# Patient Record
Sex: MALE | Race: WHITE | URBAN - METROPOLITAN AREA
[De-identification: names, ages, dates, MRNs, and addresses within clinical notes are randomized per-mention and may not be internally consistent; named-entity substitution may affect disease eponyms.]

---

## 2017-11-28 ENCOUNTER — OFFICE VISIT (OUTPATIENT)
Dept: ORTHOPEDIC SURGERY | Age: 27
End: 2017-11-28

## 2017-11-28 VITALS
SYSTOLIC BLOOD PRESSURE: 117 MMHG | WEIGHT: 220 LBS | HEART RATE: 74 BPM | BODY MASS INDEX: 29.8 KG/M2 | HEIGHT: 72 IN | DIASTOLIC BLOOD PRESSURE: 81 MMHG

## 2017-11-28 DIAGNOSIS — M67.372 TRANSIENT SYNOVITIS, LEFT ANKLE AND FOOT: ICD-10-CM

## 2017-11-28 DIAGNOSIS — M79.672 FOOT PAIN, LEFT: Primary | ICD-10-CM

## 2017-11-28 PROBLEM — K64.5 THROMBOSED EXTERNAL HEMORRHOID: Status: ACTIVE | Noted: 2017-06-27

## 2017-11-28 PROCEDURE — 99203 OFFICE O/P NEW LOW 30 MIN: CPT | Performed by: PODIATRIST

## 2017-12-19 ENCOUNTER — OFFICE VISIT (OUTPATIENT)
Dept: ORTHOPEDIC SURGERY | Age: 27
End: 2017-12-19

## 2017-12-19 VITALS
BODY MASS INDEX: 29.8 KG/M2 | HEART RATE: 60 BPM | DIASTOLIC BLOOD PRESSURE: 80 MMHG | WEIGHT: 220.02 LBS | SYSTOLIC BLOOD PRESSURE: 119 MMHG | HEIGHT: 72 IN

## 2017-12-19 DIAGNOSIS — M67.372 TRANSIENT SYNOVITIS, LEFT ANKLE AND FOOT: Primary | ICD-10-CM

## 2017-12-19 PROCEDURE — 99212 OFFICE O/P EST SF 10 MIN: CPT | Performed by: PODIATRIST

## 2023-06-01 NOTE — PROGRESS NOTES
[facility]ail[facility]   2023    Rodney Romero (:  1990) is a 28 y.o. male, here for evaluation of the following medical concerns:    Chief Complaint   Patient presents with    Establish Care        ASSESSMENT/ PLAN  1. External hemorrhoids  -Stable no new bleeding. 2. Gastroesophageal reflux disease, unspecified whether esophagitis present  -Well-controlled. Not on any medications at this time    3. Seasonal allergies  -Continue Zyrtec    4. Mixed hyperlipidemia  -Obtain labs. Not on any medications at this time    5. Preventative health care  - TSH with Reflex to FT4; Future  - Lipid Panel; Future  - Comprehensive Metabolic Panel; Future  - CBC with Auto Differential; Future  - Hemoglobin A1C; Future    6. Encounter to establish care  Reviewed past history with patient. Return in about 6 months (around 2023) for annual physical .      HPI  -Patient is a 41-year-old male presenting to establish care with me. Past medical history significant for seasonal allergies, GERD, external hemorrhoids, hyperlipidemia. No new symptoms at this time. Takes Zyrtec and Flonase on and off for allergies, GERD symptoms well controlled not on any medications at this time. Denies any chest pain, shortness of breath. Noted his previous physician passed away and wants to establish care with me. ROS    CONSTITUTIONAL:  No fevers, chills, sweats or weight changes  EYES:  No redness or visual symptoms. EARS, NOSE AND THROAT:  No difficulties with hearing. No symptoms of rhinitis or sore throat. CARDIOVASCULAR:  No chest pains, palpitations, orthopnea or paroxysmal noctunal dyspnea. RESPIRATORY:  No dyspnea o exertion, wheezing or cough. GI:  No nausea, vomiting, diarrhea, constipation, abdominal pain, hematochezia or melena. :  No dysuria, urinary hesitancy or dribbling. No nocturia or urinary frequency. No abnormal urethral  discharge.   MUSCULOSKELETAL:  No muscle, joint or back aches  NEUROLOGIC:  No

## 2023-06-02 ENCOUNTER — OFFICE VISIT (OUTPATIENT)
Dept: INTERNAL MEDICINE CLINIC | Age: 33
End: 2023-06-02

## 2023-06-02 VITALS
WEIGHT: 218 LBS | HEART RATE: 68 BPM | HEIGHT: 73 IN | TEMPERATURE: 97.7 F | BODY MASS INDEX: 28.89 KG/M2 | OXYGEN SATURATION: 97 % | SYSTOLIC BLOOD PRESSURE: 122 MMHG | DIASTOLIC BLOOD PRESSURE: 76 MMHG

## 2023-06-02 DIAGNOSIS — K21.9 GASTROESOPHAGEAL REFLUX DISEASE, UNSPECIFIED WHETHER ESOPHAGITIS PRESENT: ICD-10-CM

## 2023-06-02 DIAGNOSIS — Z76.89 ENCOUNTER TO ESTABLISH CARE: Primary | ICD-10-CM

## 2023-06-02 DIAGNOSIS — J30.2 SEASONAL ALLERGIES: ICD-10-CM

## 2023-06-02 DIAGNOSIS — Z00.00 PREVENTATIVE HEALTH CARE: ICD-10-CM

## 2023-06-02 DIAGNOSIS — E78.2 MIXED HYPERLIPIDEMIA: ICD-10-CM

## 2023-06-02 DIAGNOSIS — K64.4 EXTERNAL HEMORRHOIDS: ICD-10-CM

## 2023-06-02 SDOH — ECONOMIC STABILITY: HOUSING INSECURITY
IN THE LAST 12 MONTHS, WAS THERE A TIME WHEN YOU DID NOT HAVE A STEADY PLACE TO SLEEP OR SLEPT IN A SHELTER (INCLUDING NOW)?: NO

## 2023-06-02 SDOH — ECONOMIC STABILITY: INCOME INSECURITY: HOW HARD IS IT FOR YOU TO PAY FOR THE VERY BASICS LIKE FOOD, HOUSING, MEDICAL CARE, AND HEATING?: NOT HARD AT ALL

## 2023-06-02 SDOH — ECONOMIC STABILITY: FOOD INSECURITY: WITHIN THE PAST 12 MONTHS, YOU WORRIED THAT YOUR FOOD WOULD RUN OUT BEFORE YOU GOT MONEY TO BUY MORE.: NEVER TRUE

## 2023-06-02 SDOH — ECONOMIC STABILITY: FOOD INSECURITY: WITHIN THE PAST 12 MONTHS, THE FOOD YOU BOUGHT JUST DIDN'T LAST AND YOU DIDN'T HAVE MONEY TO GET MORE.: NEVER TRUE

## 2023-06-02 ASSESSMENT — PATIENT HEALTH QUESTIONNAIRE - PHQ9
SUM OF ALL RESPONSES TO PHQ9 QUESTIONS 1 & 2: 0
SUM OF ALL RESPONSES TO PHQ QUESTIONS 1-9: 0
2. FEELING DOWN, DEPRESSED OR HOPELESS: 0
SUM OF ALL RESPONSES TO PHQ QUESTIONS 1-9: 0
1. LITTLE INTEREST OR PLEASURE IN DOING THINGS: 0

## 2023-08-08 ENCOUNTER — HOSPITAL ENCOUNTER (OUTPATIENT)
Age: 33
Discharge: HOME | End: 2023-08-08
Payer: COMMERCIAL

## 2023-08-08 DIAGNOSIS — Z00.00 PREVENTATIVE HEALTH CARE: ICD-10-CM

## 2023-08-08 LAB
ALBUMIN SERPL-MCNC: 4.8 G/DL (ref 3.4–5)
ALBUMIN/GLOB SERPL: 1.7 {RATIO} (ref 1.1–2.2)
ALP SERPL-CCNC: 97 U/L (ref 40–129)
ALT SERPL-CCNC: 42 U/L (ref 10–40)
ANION GAP SERPL CALCULATED.3IONS-SCNC: 9 MMOL/L (ref 3–16)
AST SERPL-CCNC: 36 U/L (ref 15–37)
BASOPHILS # BLD: 0 K/UL (ref 0–0.2)
BASOPHILS NFR BLD: 0.6 %
BILIRUB SERPL-MCNC: 0.5 MG/DL (ref 0–1)
BUN SERPL-MCNC: 16 MG/DL (ref 7–20)
CALCIUM SERPL-MCNC: 10.1 MG/DL (ref 8.3–10.6)
CHLORIDE SERPL-SCNC: 102 MMOL/L (ref 99–110)
CHOLEST SERPL-MCNC: 312 MG/DL (ref 0–199)
CO2 SERPL-SCNC: 26 MMOL/L (ref 21–32)
CREAT SERPL-MCNC: 1 MG/DL (ref 0.9–1.3)
DEPRECATED RDW RBC AUTO: 13.7 % (ref 12.4–15.4)
EOSINOPHIL # BLD: 0.1 K/UL (ref 0–0.6)
EOSINOPHIL NFR BLD: 2.4 %
EST. AVERAGE GLUCOSE BLD GHB EST-MCNC: 105.4 MG/DL
GFR SERPLBLD CREATININE-BSD FMLA CKD-EPI: >60 ML/MIN/{1.73_M2}
GLUCOSE SERPL-MCNC: 97 MG/DL (ref 70–99)
HBA1C MFR BLD: 5.3 %
HCT VFR BLD AUTO: 42.3 % (ref 40.5–52.5)
HDLC SERPL-MCNC: 41 MG/DL (ref 40–60)
HGB BLD-MCNC: 14.4 G/DL (ref 13.5–17.5)
LDLC SERPL CALC-MCNC: ABNORMAL MG/DL
LDLC SERPL-MCNC: 157 MG/DL
LYMPHOCYTES # BLD: 1.9 K/UL (ref 1–5.1)
LYMPHOCYTES NFR BLD: 38.4 %
MCH RBC QN AUTO: 28.9 PG (ref 26–34)
MCHC RBC AUTO-ENTMCNC: 34.1 G/DL (ref 31–36)
MCV RBC AUTO: 84.6 FL (ref 80–100)
MONOCYTES # BLD: 0.4 K/UL (ref 0–1.3)
MONOCYTES NFR BLD: 7.9 %
NEUTROPHILS # BLD: 2.5 K/UL (ref 1.7–7.7)
NEUTROPHILS NFR BLD: 50.7 %
PLATELET # BLD AUTO: 217 K/UL (ref 135–450)
PMV BLD AUTO: 8 FL (ref 5–10.5)
POTASSIUM SERPL-SCNC: 4.5 MMOL/L (ref 3.5–5.1)
PROT SERPL-MCNC: 7.6 G/DL (ref 6.4–8.2)
RBC # BLD AUTO: 5 M/UL (ref 4.2–5.9)
SODIUM SERPL-SCNC: 137 MMOL/L (ref 136–145)
T4 FREE SERPL-MCNC: 1.1 NG/DL (ref 0.9–1.8)
TRIGL SERPL-MCNC: 498 MG/DL (ref 0–150)
TSH SERPL DL<=0.005 MIU/L-ACNC: 4.29 UIU/ML (ref 0.27–4.2)
VLDLC SERPL CALC-MCNC: ABNORMAL MG/DL
WBC # BLD AUTO: 5 K/UL (ref 4–11)

## 2023-08-08 PROCEDURE — 85025 COMPLETE CBC W/AUTO DIFF WBC: CPT

## 2023-08-08 PROCEDURE — 83036 HEMOGLOBIN GLYCOSYLATED A1C: CPT

## 2023-08-08 PROCEDURE — 36415 COLL VENOUS BLD VENIPUNCTURE: CPT

## 2023-08-08 PROCEDURE — 80061 LIPID PANEL: CPT

## 2023-08-08 PROCEDURE — 84443 ASSAY THYROID STIM HORMONE: CPT

## 2023-08-08 PROCEDURE — 80053 COMPREHEN METABOLIC PANEL: CPT

## 2023-08-08 PROCEDURE — 84439 ASSAY OF FREE THYROXINE: CPT

## 2023-08-08 PROCEDURE — 83721 ASSAY OF BLOOD LIPOPROTEIN: CPT

## 2023-08-11 ENCOUNTER — OFFICE VISIT (OUTPATIENT)
Dept: INTERNAL MEDICINE CLINIC | Age: 33
End: 2023-08-11
Payer: COMMERCIAL

## 2023-08-11 VITALS
OXYGEN SATURATION: 98 % | DIASTOLIC BLOOD PRESSURE: 84 MMHG | SYSTOLIC BLOOD PRESSURE: 126 MMHG | WEIGHT: 216 LBS | BODY MASS INDEX: 28.63 KG/M2 | HEIGHT: 73 IN | TEMPERATURE: 97.4 F | HEART RATE: 68 BPM

## 2023-08-11 DIAGNOSIS — E03.8 SUBCLINICAL HYPOTHYROIDISM: ICD-10-CM

## 2023-08-11 DIAGNOSIS — K21.9 GASTROESOPHAGEAL REFLUX DISEASE, UNSPECIFIED WHETHER ESOPHAGITIS PRESENT: ICD-10-CM

## 2023-08-11 DIAGNOSIS — K64.4 EXTERNAL HEMORRHOIDS: ICD-10-CM

## 2023-08-11 DIAGNOSIS — J30.2 SEASONAL ALLERGIES: ICD-10-CM

## 2023-08-11 DIAGNOSIS — E78.2 MIXED HYPERLIPIDEMIA: ICD-10-CM

## 2023-08-11 DIAGNOSIS — Z00.00 ENCOUNTER FOR WELL ADULT EXAM WITHOUT ABNORMAL FINDINGS: Primary | ICD-10-CM

## 2023-08-11 PROCEDURE — 99395 PREV VISIT EST AGE 18-39: CPT | Performed by: STUDENT IN AN ORGANIZED HEALTH CARE EDUCATION/TRAINING PROGRAM

## 2023-10-13 ENCOUNTER — TELEPHONE (OUTPATIENT)
Dept: INTERNAL MEDICINE CLINIC | Age: 33
End: 2023-10-13

## 2023-10-25 NOTE — PROGRESS NOTES
Social Determinants of Health     Financial Resource Strain: Low Risk  (6/2/2023)    Overall Financial Resource Strain (CARDIA)     Difficulty of Paying Living Expenses: Not hard at all   Food Insecurity: No Food Insecurity (6/2/2023)    Hunger Vital Sign     Worried About Running Out of Food in the Last Year: Never true     Ran Out of Food in the Last Year: Never true   Transportation Needs: Unknown (6/2/2023)    PRAPARE - Transportation     Lack of Transportation (Medical): Not on file     Lack of Transportation (Non-Medical): No   Physical Activity: Not on file   Stress: Not on file   Social Connections: Not on file   Intimate Partner Violence: Not on file   Housing Stability: Unknown (6/2/2023)    Housing Stability Vital Sign     Unable to Pay for Housing in the Last Year: Not on file     Number of Places Lived in the Last Year: Not on file     Unstable Housing in the Last Year: No     DRUG/FOOD ALLERGIES: Patient has no known allergies. CURRENT MEDICATIONS  Prior to Admission medications    Medication Sig Start Date End Date Taking? Authorizing Provider   azelastine (ASTELIN) 0.1 % nasal spray 1 spray by Nasal route 2 times daily Use in each nostril as directed 10/26/23  Yes Sonny Croft, DO   Cetirizine HCl (ZYRTEC ALLERGY PO) Take by mouth   Yes ProviderLinda MD   hydrocortisone-pramoxine (ANALPRAM-HC) 2.5-1 % rectal cream APPLY RECTALLY 3 TIMES A DAY AS NEEDED 12/6/17   ProviderLinda MD     REVIEW OF SYSTEMS  The following systems were reviewed and revealed the following in addition to any already discussed in the HPI:  Review of Systems   Constitutional:  Negative for fatigue and fever. HENT:  Positive for congestion, rhinorrhea and tinnitus. Negative for ear pain, postnasal drip and sneezing. Eyes:  Negative for pain and visual disturbance. Respiratory:  Negative for cough and shortness of breath. Cardiovascular:  Negative for chest pain.    Gastrointestinal:  Negative for

## 2023-10-26 ENCOUNTER — PROCEDURE VISIT (OUTPATIENT)
Dept: AUDIOLOGY | Age: 33
End: 2023-10-26
Payer: COMMERCIAL

## 2023-10-26 ENCOUNTER — OFFICE VISIT (OUTPATIENT)
Dept: ENT CLINIC | Age: 33
End: 2023-10-26
Payer: COMMERCIAL

## 2023-10-26 VITALS
HEART RATE: 67 BPM | BODY MASS INDEX: 30.48 KG/M2 | SYSTOLIC BLOOD PRESSURE: 131 MMHG | WEIGHT: 230 LBS | DIASTOLIC BLOOD PRESSURE: 89 MMHG | RESPIRATION RATE: 16 BRPM | HEIGHT: 73 IN

## 2023-10-26 DIAGNOSIS — J30.2 SEASONAL ALLERGIES: Primary | ICD-10-CM

## 2023-10-26 DIAGNOSIS — Z01.10 ENCOUNTER FOR EXAM OF EARS AND HEARING W/O ABNORMAL FINDINGS: ICD-10-CM

## 2023-10-26 DIAGNOSIS — H93.8X3 EAR PRESSURE, BILATERAL: Primary | ICD-10-CM

## 2023-10-26 DIAGNOSIS — H93.13 TINNITUS OF BOTH EARS: ICD-10-CM

## 2023-10-26 DIAGNOSIS — Z01.10 NORMAL HEARING TEST: ICD-10-CM

## 2023-10-26 PROCEDURE — 92588 EVOKED AUDITORY TST COMPLETE: CPT | Performed by: AUDIOLOGIST

## 2023-10-26 PROCEDURE — 92567 TYMPANOMETRY: CPT | Performed by: AUDIOLOGIST

## 2023-10-26 PROCEDURE — 31231 NASAL ENDOSCOPY DX: CPT | Performed by: STUDENT IN AN ORGANIZED HEALTH CARE EDUCATION/TRAINING PROGRAM

## 2023-10-26 PROCEDURE — 92557 COMPREHENSIVE HEARING TEST: CPT | Performed by: AUDIOLOGIST

## 2023-10-26 PROCEDURE — 99204 OFFICE O/P NEW MOD 45 MIN: CPT | Performed by: STUDENT IN AN ORGANIZED HEALTH CARE EDUCATION/TRAINING PROGRAM

## 2023-10-26 RX ORDER — AZELASTINE 1 MG/ML
1 SPRAY, METERED NASAL 2 TIMES DAILY
Qty: 30 ML | Refills: 3 | Status: SHIPPED | OUTPATIENT
Start: 2023-10-26

## 2023-10-26 ASSESSMENT — ENCOUNTER SYMPTOMS
RHINORRHEA: 1
NAUSEA: 0
VOMITING: 0
COUGH: 0
EYE PAIN: 0
SHORTNESS OF BREATH: 0

## 2023-10-26 NOTE — PROGRESS NOTES
Simi Bob   1990, 28 y.o. male   [id number]       Referring Provider: Vianey Davis DO  Referral Type: In an order in [address] [place] [address]    Reason for Visit: Evaluation of the cause of disorders of hearing, tinnitus, or balance. ADULT AUDIOLOGIC EVALUATION      Simi Bob is a 28 y.o. male seen today, 10/26/2023 , for an initial audiologic evaluation. Patient was seen by Vianey Davis DO following today's evaluation. Patient was alone. AUDIOLOGIC AND OTHER PERTINENT MEDICAL HISTORY:      Simi Bob noted aural fullness, tinnitus, history of recreational noise exposure, and family history of hearing loss. Patient reports perceived decline in hearing and bilateral aural fullness after going to a bar in [place] around three weeks ago. He noted having constant bilateral tinnitus after but has since resolved. Patient has a family history of hearing loss, father, occurring in childhood. Patient has a history of occupational noise exposure, concerts, with the use of hearing protection. Simi Bob denied otalgia, dizziness, history of head trauma, and history of ear surgery. Date: 10/26/2023     IMPRESSIONS:      Normal middle ear pressure and compliance, bilaterally. Mixed cochlear outer hair cell function from 1500-12,000 Hz, bilaterally. Normal hearing sensitivity with excellent word understanding at normal conversation level, bilaterally. Follow medical recommendations of Aaron Croft DO.     ASSESSMENT AND FINDINGS:     Otoscopy revealed: Clear ear canals bilaterally    RIGHT EAR:  Hearing Sensitivity:Hearing sensitivity within normal limits from 250-8000 Hz  Speech Recognition Threshold: 5 dB HL  Word Recognition:Excellent (100%), based on NU-6 Ordered-by-Difficulty 10-word list at 50 dBHL using recorded speech stimuli. Tympanometry: Normal peak pressure and compliance, Type A tympanogram, consistent with normal middle ear function.       LEFT EAR:  Hearing Sensitivity:Hearing sensitivity within normal

## 2023-12-07 ENCOUNTER — TELEPHONE (OUTPATIENT)
Dept: INTERNAL MEDICINE CLINIC | Age: 33
End: 2023-12-07

## 2023-12-07 NOTE — TELEPHONE ENCOUNTER
----- Message from Erwin Colvin sent at 12/7/2023  4:04 PM EST -----  Subject: Message to Provider    QUESTIONS  Information for Provider? Delisa Mejía with [facility] had   called on behalf of the patient is regards to life insurance that he had   purchased but they had faxed over a request for medical records to be sent   over which they did receive. But they are missing lab work records from   his August visit but they did receive everything else. Fax- [phone]   if these could be faxed over to them. Phone# [phone] Direct extension   310 for Bella. Please advise.   ---------------------------------------------------------------------------  --------------  Jennifer Sneed INFO  [phone]; OK to leave message on voicemail  ---------------------------------------------------------------------------  --------------  SCRIPT ANSWERS  Relationship to Patient? Covered Entity  Covered Entity Type? Other  Other Covered Entity Type? Medical record Company  Representative Name?  Delisa Mejía

## 2024-08-26 ASSESSMENT — PATIENT HEALTH QUESTIONNAIRE - PHQ9
SUM OF ALL RESPONSES TO PHQ QUESTIONS 1-9: 1
SUM OF ALL RESPONSES TO PHQ9 QUESTIONS 1 & 2: 1
SUM OF ALL RESPONSES TO PHQ QUESTIONS 1-9: 1
2. FEELING DOWN, DEPRESSED OR HOPELESS: SEVERAL DAYS
1. LITTLE INTEREST OR PLEASURE IN DOING THINGS: NOT AT ALL
1. LITTLE INTEREST OR PLEASURE IN DOING THINGS: NOT AT ALL
SUM OF ALL RESPONSES TO PHQ QUESTIONS 1-9: 1
SUM OF ALL RESPONSES TO PHQ QUESTIONS 1-9: 1
SUM OF ALL RESPONSES TO PHQ9 QUESTIONS 1 & 2: 1
2. FEELING DOWN, DEPRESSED OR HOPELESS: SEVERAL DAYS

## 2024-08-29 ENCOUNTER — OFFICE VISIT (OUTPATIENT)
Dept: INTERNAL MEDICINE CLINIC | Age: 34
End: 2024-08-29
Payer: COMMERCIAL

## 2024-08-29 VITALS
WEIGHT: 227 LBS | BODY MASS INDEX: 30.09 KG/M2 | DIASTOLIC BLOOD PRESSURE: 77 MMHG | HEIGHT: 73 IN | OXYGEN SATURATION: 98 % | TEMPERATURE: 97.2 F | SYSTOLIC BLOOD PRESSURE: 125 MMHG | HEART RATE: 58 BPM

## 2024-08-29 DIAGNOSIS — E78.2 MIXED HYPERLIPIDEMIA: ICD-10-CM

## 2024-08-29 DIAGNOSIS — K64.4 EXTERNAL HEMORRHOIDS: ICD-10-CM

## 2024-08-29 DIAGNOSIS — E03.8 SUBCLINICAL HYPOTHYROIDISM: ICD-10-CM

## 2024-08-29 DIAGNOSIS — J30.2 SEASONAL ALLERGIES: ICD-10-CM

## 2024-08-29 DIAGNOSIS — K21.9 GASTROESOPHAGEAL REFLUX DISEASE, UNSPECIFIED WHETHER ESOPHAGITIS PRESENT: ICD-10-CM

## 2024-08-29 DIAGNOSIS — Z00.00 ENCOUNTER FOR WELL ADULT EXAM WITHOUT ABNORMAL FINDINGS: Primary | ICD-10-CM

## 2024-08-29 PROCEDURE — 99395 PREV VISIT EST AGE 18-39: CPT | Performed by: STUDENT IN AN ORGANIZED HEALTH CARE EDUCATION/TRAINING PROGRAM

## 2024-08-29 SDOH — ECONOMIC STABILITY: FOOD INSECURITY: WITHIN THE PAST 12 MONTHS, YOU WORRIED THAT YOUR FOOD WOULD RUN OUT BEFORE YOU GOT MONEY TO BUY MORE.: NEVER TRUE

## 2024-08-29 SDOH — ECONOMIC STABILITY: INCOME INSECURITY: HOW HARD IS IT FOR YOU TO PAY FOR THE VERY BASICS LIKE FOOD, HOUSING, MEDICAL CARE, AND HEATING?: NOT HARD AT ALL

## 2024-08-29 SDOH — ECONOMIC STABILITY: FOOD INSECURITY: WITHIN THE PAST 12 MONTHS, THE FOOD YOU BOUGHT JUST DIDN'T LAST AND YOU DIDN'T HAVE MONEY TO GET MORE.: NEVER TRUE

## 2024-08-29 NOTE — PROGRESS NOTES
Well Adult Note  Name: Amilcar García Today’s Date: 2024   MRN: [id number] Sex: Male   Age: 34 y.o. Ethnicity: Unavailable / Unknown   : 1990 Race: White (non-)      Amilcar García is here for a well adult exam.       Subjective   History:  Patient is a 31-year-old male with past medical history significant for hyperlipidemia, GERD, subclinical hypothyroidism, seasonal allergies presenting for follow-up.  He denies any chest pain, shortness of breath.  Due for labs.    Review of Systems  14 point ROS negative except as above  No Known Allergies  Prior to Visit Medications    Medication Sig Taking? Authorizing Provider   azelastine (ASTELIN) 0.1 % nasal spray 1 spray by Nasal route 2 times daily Use in each nostril as directed  Aaron Croft DO   hydrocortisone-pramoxine (ANALPRAM-HC) 2.5-1 % rectal cream APPLY RECTALLY 3 TIMES A DAY AS NEEDED  ProviderLinda MD   Cetirizine HCl (ZYRTEC ALLERGY PO) Take by mouth  Provider, MD Linda     Past Medical History:   Diagnosis Date    Transient synovitis, left ankle and foot 2017     Past Surgical History:   Procedure Laterality Date    EYE SURGERY  12 years ago    Lasik     Family History   Problem Relation Age of Onset    Cancer Maternal Grandfather     Cancer Maternal Grandmother     High Cholesterol Maternal Uncle      Social History     Tobacco Use    Smoking status: Some Days     Types: Cigars    Smokeless tobacco: Never    Tobacco comments:     Some a cigar here and there   Substance Use Topics    Alcohol use: Yes     Alcohol/week: 12.0 standard drinks of alcohol     Types: 4 Glasses of wine, 4 Cans of beer, 4 Shots of liquor per week     Comment: Slowly getting away from alcohol    Drug use: No           Objective   Vital Signs  /77 (Site: Right Upper Arm, Position: Sitting, Cuff Size: Medium Adult)   Pulse 58   Temp 97.2 °F (36.2 °C) (Infrared)   Ht 1.854 m (6' 1\")   Wt 103 kg (227 lb)   SpO2 98%   BMI 29.95 kg/m²     Wt

## 2025-01-22 DIAGNOSIS — E78.2 MIXED HYPERLIPIDEMIA: ICD-10-CM

## 2025-01-22 DIAGNOSIS — E03.8 SUBCLINICAL HYPOTHYROIDISM: ICD-10-CM

## 2025-01-22 LAB
ALBUMIN SERPL-MCNC: 4.5 G/DL (ref 3.4–5)
ALBUMIN/GLOB SERPL: 1.9 {RATIO} (ref 1.1–2.2)
ALP SERPL-CCNC: 141 U/L (ref 40–129)
ALT SERPL-CCNC: 184 U/L (ref 10–40)
ANION GAP SERPL CALCULATED.3IONS-SCNC: 11 MMOL/L (ref 3–16)
AST SERPL-CCNC: 38 U/L (ref 15–37)
BILIRUB SERPL-MCNC: <0.2 MG/DL (ref 0–1)
BUN SERPL-MCNC: 12 MG/DL (ref 7–20)
CALCIUM SERPL-MCNC: 10 MG/DL (ref 8.3–10.6)
CHLORIDE SERPL-SCNC: 103 MMOL/L (ref 99–110)
CHOLEST SERPL-MCNC: 317 MG/DL (ref 0–199)
CO2 SERPL-SCNC: 26 MMOL/L (ref 21–32)
CREAT SERPL-MCNC: 1 MG/DL (ref 0.9–1.3)
GFR SERPLBLD CREATININE-BSD FMLA CKD-EPI: >90 ML/MIN/{1.73_M2}
GLUCOSE SERPL-MCNC: 94 MG/DL (ref 70–99)
HDLC SERPL-MCNC: 26 MG/DL (ref 40–60)
LDLC SERPL CALC-MCNC: ABNORMAL MG/DL
LDLC SERPL-MCNC: 124 MG/DL
POTASSIUM SERPL-SCNC: 4.4 MMOL/L (ref 3.5–5.1)
PROT SERPL-MCNC: 6.9 G/DL (ref 6.4–8.2)
SODIUM SERPL-SCNC: 140 MMOL/L (ref 136–145)
TRIGL SERPL-MCNC: 641 MG/DL (ref 0–150)
TSH SERPL DL<=0.005 MIU/L-ACNC: 3.28 UIU/ML (ref 0.27–4.2)
VLDLC SERPL CALC-MCNC: ABNORMAL MG/DL

## 2025-01-23 SDOH — ECONOMIC STABILITY: TRANSPORTATION INSECURITY
IN THE PAST 12 MONTHS, HAS LACK OF TRANSPORTATION KEPT YOU FROM MEETINGS, WORK, OR FROM GETTING THINGS NEEDED FOR DAILY LIVING?: NO

## 2025-01-23 SDOH — ECONOMIC STABILITY: FOOD INSECURITY: WITHIN THE PAST 12 MONTHS, THE FOOD YOU BOUGHT JUST DIDN'T LAST AND YOU DIDN'T HAVE MONEY TO GET MORE.: NEVER TRUE

## 2025-01-23 SDOH — ECONOMIC STABILITY: TRANSPORTATION INSECURITY
IN THE PAST 12 MONTHS, HAS THE LACK OF TRANSPORTATION KEPT YOU FROM MEDICAL APPOINTMENTS OR FROM GETTING MEDICATIONS?: NO

## 2025-01-23 SDOH — ECONOMIC STABILITY: FOOD INSECURITY: WITHIN THE PAST 12 MONTHS, YOU WORRIED THAT YOUR FOOD WOULD RUN OUT BEFORE YOU GOT MONEY TO BUY MORE.: NEVER TRUE

## 2025-01-23 SDOH — ECONOMIC STABILITY: INCOME INSECURITY: IN THE LAST 12 MONTHS, WAS THERE A TIME WHEN YOU WERE NOT ABLE TO PAY THE MORTGAGE OR RENT ON TIME?: YES

## 2025-01-23 ASSESSMENT — PATIENT HEALTH QUESTIONNAIRE - PHQ9
2. FEELING DOWN, DEPRESSED OR HOPELESS: NOT AT ALL
SUM OF ALL RESPONSES TO PHQ9 QUESTIONS 1 & 2: 0
SUM OF ALL RESPONSES TO PHQ QUESTIONS 1-9: 0
1. LITTLE INTEREST OR PLEASURE IN DOING THINGS: NOT AT ALL
SUM OF ALL RESPONSES TO PHQ QUESTIONS 1-9: 0
SUM OF ALL RESPONSES TO PHQ QUESTIONS 1-9: 0
SUM OF ALL RESPONSES TO PHQ9 QUESTIONS 1 & 2: 0
2. FEELING DOWN, DEPRESSED OR HOPELESS: NOT AT ALL
SUM OF ALL RESPONSES TO PHQ QUESTIONS 1-9: 0
1. LITTLE INTEREST OR PLEASURE IN DOING THINGS: NOT AT ALL

## 2025-01-24 ENCOUNTER — OFFICE VISIT (OUTPATIENT)
Dept: INTERNAL MEDICINE CLINIC | Age: 35
End: 2025-01-24
Payer: COMMERCIAL

## 2025-01-24 VITALS
HEART RATE: 62 BPM | WEIGHT: 232 LBS | OXYGEN SATURATION: 97 % | DIASTOLIC BLOOD PRESSURE: 84 MMHG | SYSTOLIC BLOOD PRESSURE: 134 MMHG | BODY MASS INDEX: 30.75 KG/M2 | HEIGHT: 73 IN

## 2025-01-24 DIAGNOSIS — R74.01 TRANSAMINITIS: ICD-10-CM

## 2025-01-24 DIAGNOSIS — R74.01 TRANSAMINITIS: Primary | ICD-10-CM

## 2025-01-24 LAB
ALBUMIN SERPL-MCNC: 4.8 G/DL (ref 3.4–5)
ALP SERPL-CCNC: 140 U/L (ref 40–129)
ALT SERPL-CCNC: 122 U/L (ref 10–40)
AST SERPL-CCNC: 33 U/L (ref 15–37)
BASOPHILS # BLD: 0.1 K/UL (ref 0–0.2)
BASOPHILS NFR BLD: 0.5 %
BILIRUB DIRECT SERPL-MCNC: <0.1 MG/DL (ref 0–0.3)
BILIRUB INDIRECT SERPL-MCNC: 0.3 MG/DL (ref 0–1)
BILIRUB SERPL-MCNC: 0.4 MG/DL (ref 0–1)
DEPRECATED RDW RBC AUTO: 13.6 % (ref 12.4–15.4)
EOSINOPHIL # BLD: 0.1 K/UL (ref 0–0.6)
EOSINOPHIL NFR BLD: 1.1 %
EST. AVERAGE GLUCOSE BLD GHB EST-MCNC: 105.4 MG/DL
FERRITIN SERPL IA-MCNC: 310 NG/ML (ref 30–400)
HAV IGM SERPL QL IA: NORMAL
HBA1C MFR BLD: 5.3 %
HBV CORE IGM SERPL QL IA: NORMAL
HBV SURFACE AG SERPL QL IA: NORMAL
HCT VFR BLD AUTO: 43 % (ref 40.5–52.5)
HCV AB SERPL QL IA: NORMAL
HGB BLD-MCNC: 15 G/DL (ref 13.5–17.5)
IRON SATN MFR SERPL: 29 % (ref 20–50)
IRON SERPL-MCNC: 108 UG/DL (ref 59–158)
LYMPHOCYTES # BLD: 1.9 K/UL (ref 1–5.1)
LYMPHOCYTES NFR BLD: 20.6 %
MCH RBC QN AUTO: 29 PG (ref 26–34)
MCHC RBC AUTO-ENTMCNC: 34.9 G/DL (ref 31–36)
MCV RBC AUTO: 83.3 FL (ref 80–100)
MONOCYTES # BLD: 0.6 K/UL (ref 0–1.3)
MONOCYTES NFR BLD: 6.7 %
NEUTROPHILS # BLD: 6.7 K/UL (ref 1.7–7.7)
NEUTROPHILS NFR BLD: 71.1 %
PLATELET # BLD AUTO: 246 K/UL (ref 135–450)
PMV BLD AUTO: 7.2 FL (ref 5–10.5)
PROT SERPL-MCNC: 7.4 G/DL (ref 6.4–8.2)
RBC # BLD AUTO: 5.16 M/UL (ref 4.2–5.9)
TIBC SERPL-MCNC: 374 UG/DL (ref 260–445)
WBC # BLD AUTO: 9.4 K/UL (ref 4–11)

## 2025-01-24 PROCEDURE — 99213 OFFICE O/P EST LOW 20 MIN: CPT | Performed by: STUDENT IN AN ORGANIZED HEALTH CARE EDUCATION/TRAINING PROGRAM

## 2025-01-24 RX ORDER — CETIRIZINE HYDROCHLORIDE 10 MG/1
10 TABLET ORAL DAILY
COMMUNITY

## 2025-01-24 NOTE — PROGRESS NOTES
[facility]  2025    Amilcar García (:  1990) is a 34 y.o. male, here for evaluation of the following medical concerns:    Chief Complaint   Patient presents with    Discuss Labs        ASSESSMENT/ PLAN  1. Transaminitis  Noted fourfold rise in ALT, marginal elevation in AST.  Patient noted of not drinking any alcoholic beverages over the last 3 to 4 months.  No recent changes in medications, Tylenol.  He does endorse CBD Gummies which could be contributing.  Repeat liver function.  Obtain hepatitis panel, iron profile, ultrasound liver.  No jaundice on exam today, no hepatomegaly.  Examination of abdomen benign  - Hepatic Function Panel; Future  - CBC with Auto Differential; Future  - Hepatitis Panel, Acute; Future  - US LIVER; Future  - Iron and TIBC; Future  - Ferritin; Future  - Hemoglobin A1C; Future      HPI  Patient is a 34-year-old male presenting to discuss labs.  Noted transaminitis with up to fourfold rise in ALT as compared to baseline.  AST borderline elevated, alk phos 1.5 times baseline.  Patient denies any right upper quadrant pain, nausea, vomiting.  He has not taken any new medications, has not had an alcoholic drink for the last 3 to 4 months.  He is try to actively lose weight.  I did tell history noted he has taken some CBD Gummies of late    ROS    CONSTITUTIONAL:  No fevers, chills, sweats or weight changes  EYES:  No redness or visual symptoms.  EARS, NOSE AND THROAT:  No difficulties with hearing.  No symptoms of rhinitis or sore throat.  CARDIOVASCULAR:  No chest pains, palpitations, orthopnea or paroxysmal noctunal dyspnea.  RESPIRATORY:  No dyspnea o exertion, wheezing or cough.  GI:  No nausea, vomiting, diarrhea, constipation, abdominal pain, hematochezia or melena.    :  No dysuria, urinary hesitancy or dribbling.  No nocturia or urinary frequency.  No abnormal urethral  discharge.  MUSCULOSKELETAL:  No muscle, joint or back aches  NEUROLOGIC:  No chronic headaches, no

## 2025-02-03 ENCOUNTER — HOSPITAL ENCOUNTER (OUTPATIENT)
Dept: ULTRASOUND IMAGING | Age: 35
Discharge: HOME | End: 2025-02-03
Payer: COMMERCIAL

## 2025-02-03 DIAGNOSIS — R74.01 TRANSAMINITIS: ICD-10-CM

## 2025-02-03 PROCEDURE — 76705 ECHO EXAM OF ABDOMEN: CPT
